# Patient Record
Sex: FEMALE | Race: OTHER | URBAN - METROPOLITAN AREA
[De-identification: names, ages, dates, MRNs, and addresses within clinical notes are randomized per-mention and may not be internally consistent; named-entity substitution may affect disease eponyms.]

---

## 2019-09-27 ENCOUNTER — EMERGENCY (EMERGENCY)
Facility: HOSPITAL | Age: 49
LOS: 1 days | Discharge: HOME | End: 2019-09-27
Admitting: EMERGENCY MEDICINE
Payer: COMMERCIAL

## 2019-09-27 VITALS
DIASTOLIC BLOOD PRESSURE: 90 MMHG | RESPIRATION RATE: 16 BRPM | OXYGEN SATURATION: 98 % | TEMPERATURE: 98 F | SYSTOLIC BLOOD PRESSURE: 136 MMHG | HEART RATE: 54 BPM
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
## 2019-09-27 DIAGNOSIS — S69.91XA UNSPECIFIED INJURY OF RIGHT WRIST, HAND AND FINGER(S), INITIAL ENCOUNTER: ICD-10-CM

## 2019-09-27 DIAGNOSIS — M79.672 PAIN IN LEFT FOOT: ICD-10-CM
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
## 2019-09-27 DIAGNOSIS — S92.355A NONDISPLACED FRACTURE OF FIFTH METATARSAL BONE, LEFT FOOT, INITIAL ENCOUNTER FOR CLOSED FRACTURE: ICD-10-CM

## 2019-09-27 PROCEDURE — 73130 X-RAY EXAM OF HAND: CPT

## 2019-09-27 PROCEDURE — 28470 CLTX METATARSAL FX WO MNP EA: CPT | Mod: LT

## 2019-09-27 PROCEDURE — 73610 X-RAY EXAM OF ANKLE: CPT | Mod: 26,LT

## 2019-09-27 PROCEDURE — 99284 EMERGENCY DEPT VISIT MOD MDM: CPT

## 2019-09-27 PROCEDURE — 99284 EMERGENCY DEPT VISIT MOD MDM: CPT | Mod: 25

## 2019-09-27 PROCEDURE — 73630 X-RAY EXAM OF FOOT: CPT

## 2019-09-27 PROCEDURE — 73130 X-RAY EXAM OF HAND: CPT | Mod: 26,RT

## 2019-09-27 PROCEDURE — 73630 X-RAY EXAM OF FOOT: CPT | Mod: 26,LT

## 2019-09-27 PROCEDURE — 73610 X-RAY EXAM OF ANKLE: CPT

## 2019-09-27 RX ORDER — IBUPROFEN 200 MG
600 TABLET ORAL ONCE
Refills: 0 | Status: COMPLETED | OUTPATIENT
Start: 2019-09-27 | End: 2019-09-27

## 2019-09-27 RX ADMIN — Medication 30 MILLILITER(S): at 10:56

## 2019-09-27 RX ADMIN — Medication 600 MILLIGRAM(S): at 10:56

## 2019-09-27 NOTE — ED ADULT TRIAGE NOTE - SPO2 (%)
Discussion/Summary  Faxed pt copy of sustaining meal plan with exchange list. Reviewed meal plan and exchange list over phone. Pt verbalized understanding. Pt verbalized frustrating with meal replacements. Pt did appreciate weekly support. Encouraged pt to continue to follow meal plan and get variety in diet.      Signatures   Electronically signed by : Melinda Correa RD; Apr 10 2017  2:03PM CST    
98

## 2019-09-27 NOTE — ED PROVIDER NOTE - PHYSICAL EXAMINATION
L ankle swelling and tenderness along 5th metatarsal; able to flex and extend. No obvious deformity or motor sensory deficit. Pulse palpable and intact. No pain at distal fibula. No tenderness to knee or calf.   R hand pain to 5th finger at PIP joint. Positive minimal swelling. Full ROM. No deformity, tenderness to MCP or wrist. Full ROM of elbow and shoulder. No spinal tenderness.

## 2019-09-27 NOTE — ED PROVIDER NOTE - OBJECTIVE STATEMENT
48 y/o F with no significant PMHX presents to ED with complaint of left foot pain and right hand pain s/p fall. Pt. stepped in pothole and consequently lost balance. Pt denies head injury, LOC, N/V, SOB, chest pain and abdominal pain. Pt denies experiencing generalized or localized numbness and tingling.

## 2019-09-27 NOTE — ED PROVIDER NOTE - CARE PROVIDER_API CALL
Radha Jason)  Orthopaedic Surgery  [address] [place] [address]  Phone: [phone]  Fax: [phone]  Follow Up Time:

## 2019-09-27 NOTE — ED ADULT NURSE NOTE - OBJECTIVE STATEMENT
50 y/o F a&ox4 BIBA c/o L foot pain. Pt state " I was crossing the street and my foot twisted in a pot hole." no LOC, no hitting head. 10/10 throbbing foot pain to lateral aspect of L ankle. + tenderness with palpation. slight swelling noted lateral aspect of L foot. no deformity noted.  denies numbness, tingling, radiation of pain. did not take medication for pain relief. no sig PMH. PSH of . no daily medication use.

## 2019-09-27 NOTE — ED PROVIDER NOTE - DIAGNOSTIC INTERPRETATION
Foot : left foot + nondisplaced fracture at 5th metatarsal, no dislocation, no avulsion fx.   hand :right hand 5th phalanx no ac fx, no dislocation or avulsion

## 2019-09-27 NOTE — ED PROVIDER NOTE - CLINICAL SUMMARY MEDICAL DECISION MAKING FREE TEXT BOX
Patient with left foot 5th MTP fx and right hand 5th phalanx injury no fx. Recommend ice therapy, nsaids and cam walker. Follow up with ortho.

## 2019-09-27 NOTE — ED PROVIDER NOTE - PATIENT PORTAL LINK FT
You can access the FollowMyHealth Patient Portal offered by [facility] by registering at the following website: [email or web address]. By joining [employer]’s FollowMyHealth portal, you will also be able to view your health information using other applications (apps) compatible with our system.

## 2020-08-22 NOTE — ED ADULT NURSE NOTE - CAS DISCH ACCOMP BY
connected to a pressurized flush line/Seldinger technique/all materials/supplies accounted for at end of procedure/location identified, draped/prepped, sterile technique used, needle inserted/introduced/positive blood return obtained via catheter/sutured in place positive blood return obtained via catheter/sutured in place/location identified, draped/prepped, sterile technique used, needle inserted/introduced/connected to a pressurized flush line/all materials/supplies accounted for at end of procedure/Seldinger technique sterile dressing applied/ultrasound guidance/lumen(s) aspirated and flushed/guidewire recovered/sterile technique, catheter placed Self

## 2024-08-02 ENCOUNTER — NON-APPOINTMENT (OUTPATIENT)
Age: 54
End: 2024-08-02

## 2024-08-07 PROBLEM — Z78.9 OTHER SPECIFIED HEALTH STATUS: Chronic | Status: ACTIVE | Noted: 2019-09-29

## 2024-08-09 PROBLEM — Z00.00 ENCOUNTER FOR PREVENTIVE HEALTH EXAMINATION: Status: ACTIVE | Noted: 2024-08-09

## 2024-08-12 ENCOUNTER — APPOINTMENT (OUTPATIENT)
Dept: ORTHOPEDIC SURGERY | Facility: CLINIC | Age: 54
End: 2024-08-12
Payer: COMMERCIAL

## 2024-08-12 VITALS — HEIGHT: 63 IN | WEIGHT: 210 LBS | BODY MASS INDEX: 37.21 KG/M2

## 2024-08-12 DIAGNOSIS — M76.892 OTHER SPECIFIED ENTHESOPATHIES OF LEFT LOWER LIMB, EXCLUDING FOOT: ICD-10-CM

## 2024-08-12 DIAGNOSIS — M76.02 GLUTEAL TENDINITIS, LEFT HIP: ICD-10-CM

## 2024-08-12 DIAGNOSIS — Z78.9 OTHER SPECIFIED HEALTH STATUS: ICD-10-CM

## 2024-08-12 PROCEDURE — 99204 OFFICE O/P NEW MOD 45 MIN: CPT

## 2024-08-12 RX ORDER — NAPROXEN 500 MG/1
500 TABLET ORAL
Qty: 20 | Refills: 0 | Status: ACTIVE | COMMUNITY
Start: 2024-08-12 | End: 1900-01-01

## 2024-08-12 NOTE — DISCUSSION/SUMMARY
[de-identified] : Discussed options, HEP Naproxen 500mg BID 3-4 days then prn Obtain MRI pelvis eval bony calcifications at ASIS and iliac crest f/u p MRI ----------------------------------------------------------------------------  Patient warned of specific risks of medication related to bleeding, GI issues, increase blood pressure, and cardiac risks in addition to additional risks.  Patient advised to discuss with PMD  if any presence of stated issues.     ----------------------------------------------------------------------------   All relevant imaging studies pertinent to today's visit, including x-rays, MRI's and/or other advanced imaging studies (CT/etc) were independently interpreted and reviewed with the patient as needed. Implications of the studies together with the patient's clinical picture were discussed to formulate a working diagnosis and management options were detailed.   The patient and/or guardian was advised of the diagnosis.  The natural history of the pathology was explained in full. All questions were answered.  The risks and benefits of conservative and interventional treatment alternatives were explained to the patient  The patient and/or guardian was advised if any advanced diagnostic/imaging study (MRI/CT/etc) is ordered to evaluate potential pathology in the affected area(s), they should follow up in the office to review the results of the study and determine further management that may be indicated.

## 2024-08-12 NOTE — HISTORY OF PRESENT ILLNESS
[Sudden] : sudden [8] : 8 [7] : 7 [Radiating] : radiating [Sharp] : sharp [Frequent] : frequent [de-identified] : This is MsRhea JUAN J SINGER  a 54 year old female who comes in today complaining of left hip pain for 2-3 weeks with no injury.  Anterolateral hip pain. Takes tylenol w no relief [] : no [FreeTextEntry7] : into groin [de-identified] : xray

## 2024-08-12 NOTE — IMAGING
[Left] : left hip with pelvis [All Views] : anteroposterior, lateral [Outside films reviewed] : Outside films reviewed [de-identified] :   ----------------------------------------------------------------------------   Left hip exam:   Inspection: no deformity, no swelling, no gross limb length discrepancy ROM:    Flexion: 100    ER: 50    IR: 20 Tenderness:+  Illiac crest , ASIS    (neg) Groin tenderness    (neg) Greater trochanteric tenderness    (neg) Buttock tenderness    (neg) IT Band tenderness    (neg) Anterior thigh tenderness    (neg)ASIS tenderness    (neg) Ischial tuberosity    (neg) Hamstring muscle tenderness Additional tests:    (neg) FADIR    (neg) MICHELLE    (neg) Resisted hip flexion pain    (neg) Apprehension (external rotation/extension)    (neg) Posterior pain with forced hip flexion and knee extension    (neg) Tight hamstrings    (neg) Log roll    (neg) Axial load Strength: 5/5 IP/Q/H/TA/GS/EHL Neuro: In tact to light touch throughout, DTR's wnl Vascularity: Extremity warm and well perfused Gait: normal.   [FreeTextEntry9] : calcifications at ASIS, small cystic change femoral head/neck junction

## 2024-08-26 ENCOUNTER — APPOINTMENT (OUTPATIENT)
Dept: MRI IMAGING | Facility: CLINIC | Age: 54
End: 2024-08-26

## 2024-09-04 ENCOUNTER — EMERGENCY (EMERGENCY)
Facility: HOSPITAL | Age: 54
LOS: 0 days | Discharge: HOME | End: 2024-09-04
Attending: STUDENT IN AN ORGANIZED HEALTH CARE EDUCATION/TRAINING PROGRAM
Payer: COMMERCIAL

## 2024-09-04 VITALS
DIASTOLIC BLOOD PRESSURE: 86 MMHG | SYSTOLIC BLOOD PRESSURE: 130 MMHG | RESPIRATION RATE: 18 BRPM | WEIGHT: 220.02 LBS | HEART RATE: 69 BPM | TEMPERATURE: 98 F | HEIGHT: 63 IN | OXYGEN SATURATION: 98 %

## 2024-09-04 DIAGNOSIS — G43.909 MIGRAINE, UNSPECIFIED, NOT INTRACTABLE, WITHOUT STATUS MIGRAINOSUS: ICD-10-CM

## 2024-09-04 DIAGNOSIS — Z86.69 PERSONAL HISTORY OF OTHER DISEASES OF THE NERVOUS SYSTEM AND SENSE ORGANS: ICD-10-CM

## 2024-09-04 DIAGNOSIS — R51.9 HEADACHE, UNSPECIFIED: ICD-10-CM

## 2024-09-04 LAB
ALBUMIN SERPL ELPH-MCNC: 3.1 G/DL — LOW (ref 3.3–5)
ALP SERPL-CCNC: 56 U/L — SIGNIFICANT CHANGE UP (ref 40–120)
ALT FLD-CCNC: 24 U/L — SIGNIFICANT CHANGE UP (ref 12–78)
ANION GAP SERPL CALC-SCNC: 7 MMOL/L — SIGNIFICANT CHANGE UP (ref 5–17)
AST SERPL-CCNC: 14 U/L — LOW (ref 15–37)
BASOPHILS # BLD AUTO: 0.02 K/UL — SIGNIFICANT CHANGE UP (ref 0–0.2)
BASOPHILS NFR BLD AUTO: 0.3 % — SIGNIFICANT CHANGE UP (ref 0–2)
BILIRUB SERPL-MCNC: 0.2 MG/DL — SIGNIFICANT CHANGE UP (ref 0.2–1.2)
BUN SERPL-MCNC: 12 MG/DL — SIGNIFICANT CHANGE UP (ref 7–23)
CALCIUM SERPL-MCNC: 9.7 MG/DL — SIGNIFICANT CHANGE UP (ref 8.5–10.1)
CHLORIDE SERPL-SCNC: 108 MMOL/L — SIGNIFICANT CHANGE UP (ref 96–108)
CO2 SERPL-SCNC: 24 MMOL/L — SIGNIFICANT CHANGE UP (ref 22–31)
CREAT SERPL-MCNC: 0.82 MG/DL — SIGNIFICANT CHANGE UP (ref 0.5–1.3)
EGFR: 85 ML/MIN/1.73M2 — SIGNIFICANT CHANGE UP
EOSINOPHIL # BLD AUTO: 0.16 K/UL — SIGNIFICANT CHANGE UP (ref 0–0.5)
EOSINOPHIL NFR BLD AUTO: 2.7 % — SIGNIFICANT CHANGE UP (ref 0–6)
GLUCOSE SERPL-MCNC: 116 MG/DL — HIGH (ref 70–99)
HCT VFR BLD CALC: 35.9 % — SIGNIFICANT CHANGE UP (ref 34.5–45)
HGB BLD-MCNC: 11.9 G/DL — SIGNIFICANT CHANGE UP (ref 11.5–15.5)
IMM GRANULOCYTES NFR BLD AUTO: 0.3 % — SIGNIFICANT CHANGE UP (ref 0–0.9)
LYMPHOCYTES # BLD AUTO: 2.68 K/UL — SIGNIFICANT CHANGE UP (ref 1–3.3)
LYMPHOCYTES # BLD AUTO: 45.1 % — HIGH (ref 13–44)
MCHC RBC-ENTMCNC: 33.1 G/DL — SIGNIFICANT CHANGE UP (ref 32–36)
MCHC RBC-ENTMCNC: 33.2 PG — SIGNIFICANT CHANGE UP (ref 27–34)
MCV RBC AUTO: 100.3 FL — HIGH (ref 80–100)
MONOCYTES # BLD AUTO: 0.54 K/UL — SIGNIFICANT CHANGE UP (ref 0–0.9)
MONOCYTES NFR BLD AUTO: 9.1 % — SIGNIFICANT CHANGE UP (ref 2–14)
NEUTROPHILS # BLD AUTO: 2.52 K/UL — SIGNIFICANT CHANGE UP (ref 1.8–7.4)
NEUTROPHILS NFR BLD AUTO: 42.5 % — LOW (ref 43–77)
NRBC # BLD: 0 /100 WBCS — SIGNIFICANT CHANGE UP (ref 0–0)
PLATELET # BLD AUTO: 225 K/UL — SIGNIFICANT CHANGE UP (ref 150–400)
POTASSIUM SERPL-MCNC: 4 MMOL/L — SIGNIFICANT CHANGE UP (ref 3.5–5.3)
POTASSIUM SERPL-SCNC: 4 MMOL/L — SIGNIFICANT CHANGE UP (ref 3.5–5.3)
PROT SERPL-MCNC: 7.7 GM/DL — SIGNIFICANT CHANGE UP (ref 6–8.3)
RBC # BLD: 3.58 M/UL — LOW (ref 3.8–5.2)
RBC # FLD: 12.6 % — SIGNIFICANT CHANGE UP (ref 10.3–14.5)
SODIUM SERPL-SCNC: 139 MMOL/L — SIGNIFICANT CHANGE UP (ref 135–145)
WBC # BLD: 5.94 K/UL — SIGNIFICANT CHANGE UP (ref 3.8–10.5)
WBC # FLD AUTO: 5.94 K/UL — SIGNIFICANT CHANGE UP (ref 3.8–10.5)

## 2024-09-04 PROCEDURE — 99284 EMERGENCY DEPT VISIT MOD MDM: CPT

## 2024-09-04 PROCEDURE — 70450 CT HEAD/BRAIN W/O DYE: CPT | Mod: 26,MC

## 2024-09-04 RX ORDER — METOCLOPRAMIDE HCL 5 MG
10 TABLET ORAL ONCE
Refills: 0 | Status: COMPLETED | OUTPATIENT
Start: 2024-09-04 | End: 2024-09-04

## 2024-09-04 RX ORDER — TRAMADOL HYDROCHLORIDE 200 MG/1
50 TABLET, EXTENDED RELEASE ORAL ONCE
Refills: 0 | Status: DISCONTINUED | OUTPATIENT
Start: 2024-09-04 | End: 2024-09-04

## 2024-09-04 RX ADMIN — Medication 10 MILLIGRAM(S): at 21:33

## 2024-09-04 RX ADMIN — TRAMADOL HYDROCHLORIDE 50 MILLIGRAM(S): 200 TABLET, EXTENDED RELEASE ORAL at 21:34

## 2024-09-04 NOTE — ED ADULT NURSE NOTE - OBJECTIVE STATEMENT
received er bed h22 c/o frontal headache with posterior neck pain intermittently x 2 weeks denies dizziness denies n/v or visual disturbance denies eye itching or d/c + photosensitivity hx migraines with similar symptoms no longer on rx migraine meds due to constipation using otc tylenol/ibuprofen prn without relief seen at urgent care 2 weeks ago for same c/o received im injection unsure of med without relief no focal neuro deficits moves all extremities with = strength and coordination denies fever/chills

## 2024-09-04 NOTE — ED PROVIDER NOTE - CLINICAL SUMMARY MEDICAL DECISION MAKING FREE TEXT BOX
54 year old female with h/o migraine headache presents today c/o headaches x 2 weeks, pt describes intermittent throbbing pain located to her forehead and posterior head, rated 9/10 at this time, pt has been using tylenol and motrin for pain without relief, pt does see a neurologist, was prescribed medication for her migraines but was very constipated, pt stopped the medication two weeks ago and now with pain, pt may start botox for future pain control, she otherwise denies flu like symptoms, fevers or chills, visual or speech changes, weakness, paresthesias. On exam pt is well appearing, nontoxic/non-lethargic appearing and in no distress appearing, presently related to pain or 9 out of 10.  Her exam is benign including neuro.  NIHSS is 0.  Differential diagnosis includes but not limited to exacerbation of migraine headache, viral illness, stress, dehydration.  Labs drawn, pain medication and CT. Will reassess and dispo    labs reviewed, ct negative for acute pathology

## 2024-09-04 NOTE — ED ADULT TRIAGE NOTE - CHIEF COMPLAINT QUOTE
Throbbing pains to frontal area of head and over eyes  for days, no associated symptoms, treated at urgent care , using otc medications without relief.

## 2024-09-04 NOTE — ED PROVIDER NOTE - OBJECTIVE STATEMENT
54 year old female with h/o migraine headache presents today c/o headaches x 2 weeks, pt describes intermittent throbbing pain located to her forehead and posterior head, rated 9/10 at this time, pt has been using tylenol and motrin for pain 54 year old female with h/o migraine headache presents today c/o headaches x 2 weeks, pt describes intermittent throbbing pain located to her forehead and posterior head, rated 9/10 at this time, pt has been using tylenol and motrin for pain with 54 year old female with h/o migraine headache presents today c/o headaches x 2 weeks, pt describes intermittent throbbing pain located to her forehead and posterior head, rated 9/10 at this time, pt has been using tylenol and motrin for pain without relief, pt does see a neurologist, was prescribed medication for her migraines but was very constipated, pt stopped the medication two weeks ago and now with pain, pt may start botox for future pain control, she otherwise denies flu like symptoms, fevers or chills, visual or speech changes, weakness, paresthesias. On exam pt is well appearing, nontoxic/non-lethargic appearing and in no distress appearing, presently related to pain or 9 out of 10.  Her exam is benign including neuro.  NIHSS is 0.  Differential diagnosis includes but not limited to exacerbation of migraine headache, viral illness, stress, dehydration.  Labs drawn, pain medication and CT. Will reassess and dispo see mdm

## 2024-09-04 NOTE — ED PROVIDER NOTE - PATIENT PORTAL LINK FT
You can access the FollowMyHealth Patient Portal offered by [facility] by registering at the following website: [email or web address]. By joining [employer]’s FollowMyHealth portal, you will also be able to view your health information using other applications (apps) compatible with our system.

## 2024-09-05 RX ORDER — BUTALBITAL, ACETAMINOPHEN AND CAFFEINE 50; 325; 40 MG/1; MG/1; MG/1
2 TABLET ORAL
Qty: 18 | Refills: 0
Start: 2024-09-05

## 2024-09-06 ENCOUNTER — APPOINTMENT (OUTPATIENT)
Dept: ORTHOPEDIC SURGERY | Facility: CLINIC | Age: 54
End: 2024-09-06

## 2024-09-07 ENCOUNTER — EMERGENCY (EMERGENCY)
Facility: HOSPITAL | Age: 54
LOS: 0 days | Discharge: HOME | End: 2024-09-07
Attending: STUDENT IN AN ORGANIZED HEALTH CARE EDUCATION/TRAINING PROGRAM
Payer: COMMERCIAL

## 2024-09-07 VITALS
RESPIRATION RATE: 20 BRPM | TEMPERATURE: 99 F | DIASTOLIC BLOOD PRESSURE: 99 MMHG | OXYGEN SATURATION: 98 % | WEIGHT: 220.02 LBS | SYSTOLIC BLOOD PRESSURE: 162 MMHG | HEIGHT: 63 IN | HEART RATE: 76 BPM

## 2024-09-07 VITALS
RESPIRATION RATE: 15 BRPM | OXYGEN SATURATION: 96 % | TEMPERATURE: 98 F | HEART RATE: 59 BPM | SYSTOLIC BLOOD PRESSURE: 116 MMHG | DIASTOLIC BLOOD PRESSURE: 78 MMHG

## 2024-09-07 DIAGNOSIS — G43.909 MIGRAINE, UNSPECIFIED, NOT INTRACTABLE, WITHOUT STATUS MIGRAINOSUS: ICD-10-CM

## 2024-09-07 DIAGNOSIS — R51.9 HEADACHE, UNSPECIFIED: ICD-10-CM

## 2024-09-07 LAB
ALBUMIN SERPL ELPH-MCNC: 3.2 G/DL — LOW (ref 3.3–5)
ALP SERPL-CCNC: 58 U/L — SIGNIFICANT CHANGE UP (ref 40–120)
ALT FLD-CCNC: 22 U/L — SIGNIFICANT CHANGE UP (ref 12–78)
ANION GAP SERPL CALC-SCNC: 7 MMOL/L — SIGNIFICANT CHANGE UP (ref 5–17)
AST SERPL-CCNC: 13 U/L — LOW (ref 15–37)
BASOPHILS # BLD AUTO: 0.01 K/UL — SIGNIFICANT CHANGE UP (ref 0–0.2)
BASOPHILS NFR BLD AUTO: 0.2 % — SIGNIFICANT CHANGE UP (ref 0–2)
BILIRUB SERPL-MCNC: 0.3 MG/DL — SIGNIFICANT CHANGE UP (ref 0.2–1.2)
BUN SERPL-MCNC: 13 MG/DL — SIGNIFICANT CHANGE UP (ref 7–23)
CALCIUM SERPL-MCNC: 10.1 MG/DL — SIGNIFICANT CHANGE UP (ref 8.5–10.1)
CHLORIDE SERPL-SCNC: 106 MMOL/L — SIGNIFICANT CHANGE UP (ref 96–108)
CO2 SERPL-SCNC: 26 MMOL/L — SIGNIFICANT CHANGE UP (ref 22–31)
CREAT SERPL-MCNC: 0.97 MG/DL — SIGNIFICANT CHANGE UP (ref 0.5–1.3)
EGFR: 70 ML/MIN/1.73M2 — SIGNIFICANT CHANGE UP
EOSINOPHIL # BLD AUTO: 0.11 K/UL — SIGNIFICANT CHANGE UP (ref 0–0.5)
EOSINOPHIL NFR BLD AUTO: 2.1 % — SIGNIFICANT CHANGE UP (ref 0–6)
GLUCOSE SERPL-MCNC: 103 MG/DL — HIGH (ref 70–99)
HCT VFR BLD CALC: 35.3 % — SIGNIFICANT CHANGE UP (ref 34.5–45)
HGB BLD-MCNC: 12 G/DL — SIGNIFICANT CHANGE UP (ref 11.5–15.5)
IMM GRANULOCYTES NFR BLD AUTO: 0.4 % — SIGNIFICANT CHANGE UP (ref 0–0.9)
LYMPHOCYTES # BLD AUTO: 2.13 K/UL — SIGNIFICANT CHANGE UP (ref 1–3.3)
LYMPHOCYTES # BLD AUTO: 40.7 % — SIGNIFICANT CHANGE UP (ref 13–44)
MCHC RBC-ENTMCNC: 33.1 PG — SIGNIFICANT CHANGE UP (ref 27–34)
MCHC RBC-ENTMCNC: 34 G/DL — SIGNIFICANT CHANGE UP (ref 32–36)
MCV RBC AUTO: 97.2 FL — SIGNIFICANT CHANGE UP (ref 80–100)
MONOCYTES # BLD AUTO: 0.61 K/UL — SIGNIFICANT CHANGE UP (ref 0–0.9)
MONOCYTES NFR BLD AUTO: 11.7 % — SIGNIFICANT CHANGE UP (ref 2–14)
NEUTROPHILS # BLD AUTO: 2.35 K/UL — SIGNIFICANT CHANGE UP (ref 1.8–7.4)
NEUTROPHILS NFR BLD AUTO: 44.9 % — SIGNIFICANT CHANGE UP (ref 43–77)
NRBC # BLD: 0 /100 WBCS — SIGNIFICANT CHANGE UP (ref 0–0)
PLATELET # BLD AUTO: 246 K/UL — SIGNIFICANT CHANGE UP (ref 150–400)
POTASSIUM SERPL-MCNC: 3.9 MMOL/L — SIGNIFICANT CHANGE UP (ref 3.5–5.3)
POTASSIUM SERPL-SCNC: 3.9 MMOL/L — SIGNIFICANT CHANGE UP (ref 3.5–5.3)
PROT SERPL-MCNC: 8 GM/DL — SIGNIFICANT CHANGE UP (ref 6–8.3)
RBC # BLD: 3.63 M/UL — LOW (ref 3.8–5.2)
RBC # FLD: 12.6 % — SIGNIFICANT CHANGE UP (ref 10.3–14.5)
SODIUM SERPL-SCNC: 139 MMOL/L — SIGNIFICANT CHANGE UP (ref 135–145)
WBC # BLD: 5.23 K/UL — SIGNIFICANT CHANGE UP (ref 3.8–10.5)
WBC # FLD AUTO: 5.23 K/UL — SIGNIFICANT CHANGE UP (ref 3.8–10.5)

## 2024-09-07 PROCEDURE — 99284 EMERGENCY DEPT VISIT MOD MDM: CPT

## 2024-09-07 RX ORDER — KETOROLAC TROMETHAMINE 30 MG/ML
30 INJECTION, SOLUTION INTRAMUSCULAR ONCE
Refills: 0 | Status: DISCONTINUED | OUTPATIENT
Start: 2024-09-07 | End: 2024-09-07

## 2024-09-07 RX ORDER — METOCLOPRAMIDE HCL 5 MG
10 TABLET ORAL ONCE
Refills: 0 | Status: COMPLETED | OUTPATIENT
Start: 2024-09-07 | End: 2024-09-07

## 2024-09-07 RX ORDER — DIPHENHYDRAMINE HCL 50 MG
50 CAPSULE ORAL ONCE
Refills: 0 | Status: COMPLETED | OUTPATIENT
Start: 2024-09-07 | End: 2024-09-07

## 2024-09-07 RX ORDER — DEXAMETHASONE 0.75 MG
10 TABLET ORAL ONCE
Refills: 0 | Status: COMPLETED | OUTPATIENT
Start: 2024-09-07 | End: 2024-09-07

## 2024-09-07 RX ORDER — SODIUM CHLORIDE 9 MG/ML
1000 INJECTION INTRAMUSCULAR; INTRAVENOUS; SUBCUTANEOUS ONCE
Refills: 0 | Status: COMPLETED | OUTPATIENT
Start: 2024-09-07 | End: 2024-09-07

## 2024-09-07 RX ADMIN — SODIUM CHLORIDE 1000 MILLILITER(S): 9 INJECTION INTRAMUSCULAR; INTRAVENOUS; SUBCUTANEOUS at 04:37

## 2024-09-07 RX ADMIN — Medication 10 MILLIGRAM(S): at 04:38

## 2024-09-07 RX ADMIN — Medication 50 MILLIGRAM(S): at 04:38

## 2024-09-07 RX ADMIN — Medication 102 MILLIGRAM(S): at 05:06

## 2024-09-07 RX ADMIN — KETOROLAC TROMETHAMINE 30 MILLIGRAM(S): 30 INJECTION, SOLUTION INTRAMUSCULAR at 04:37

## 2024-09-07 NOTE — ED PROVIDER NOTE - CARE PROVIDER_API CALL
Mian Reyes)  Neurology  [address], [address]  [place] [address]  Phone: [phone]  Fax: [phone]  Follow Up Time:     Jessi Godfrey  Neurology  [address] [place] [address]  Phone: [phone]  Fax: [phone]  Follow Up Time:

## 2024-09-07 NOTE — ED ADULT TRIAGE NOTE - CHIEF COMPLAINT QUOTE
hx of migraines here 2 days ago for same pt reports meds given not effective. denies N/V, dizziness.

## 2024-09-07 NOTE — ED PROVIDER NOTE - PATIENT PORTAL LINK FT
You can access the FollowMyHealth Patient Portal offered by [facility] by registering at the following website: [email or web address]. By joining [employer]’s FollowMyHealth portal, you will also be able to view your health information using other applications (apps) compatible with our system.

## 2024-09-07 NOTE — ED PROVIDER NOTE - CLINICAL SUMMARY MEDICAL DECISION MAKING FREE TEXT BOX
53yo female with pmh migraines presenting with headache.  Was seen here 2 days ago for the same and initially improved after meds but came back yesterday and still hurting.  Denies any new symptoms.  Pain is the same as that visit, R side forehead radiating posteriorly.  No fever, sudden onset, numbness, weakness, visual changes, difficulty ambulating.  CT previous visit negative for acute pathology.  Neuro intact here.  Seems c/w migraine, patient states similar to prior.  Will medicate and reassess.

## 2024-09-07 NOTE — ED PROVIDER NOTE - NSFOLLOWUPINSTRUCTIONS_ED_ALL_ED_FT
Rest, drink plenty of fluids  Advance activity as tolerated  Continue all previously prescribed medications as directed  Follow up with your PMD - bring copies of your results  Return to the ER for severe pain, fevers, worsening symptoms, or other new or concerning symptoms

## 2024-09-07 NOTE — ED ADULT TRIAGE NOTE - CODE STROKE ACTIVE YN
Patient is requesting medication refill to [facility] [place]. Please approve or deny this request.    Rx requested:  Requested Prescriptions     Pending Prescriptions Disp Refills    levothyroxine (SYNTHROID) 50 MCG tablet 30 tablet      Sig: take 1 tablet by mouth once daily       Last Office Visit:   12/18/2017  Upcoming appt on 10/8/2018    Next Visit Date:  Future Appointments  Date Time Provider Ariadna Bright   10/8/2018 10:00 AM MD Girish Patino Marylu 89 No

## 2024-09-07 NOTE — ED ADULT NURSE NOTE - OBJECTIVE STATEMENT
pt is AOx3, ambulatory, pt presents to the ED tonight with complaints of migraine since Wednesday. pt states she came into the ED on Wednesday with complaints of migraines. pt states she had a full workup in the ED and the CT/blood work was negative. pt returned do to no relief after being sent home with medication. pt states the pain is getting progressively worse. pt denies any dizziness/lightheadedness/N/V/D at this time. pt rates her pain a 10/10, pt denies taking pain medication PTA in the ED tonight. pt denies any PMH

## 2024-09-07 NOTE — ED ADULT NURSE NOTE - NSFALLUNIVINTERV_ED_ALL_ED
Bed/Stretcher in lowest position, wheels locked, appropriate side rails in place/Call bell, personal items and telephone in reach/Instruct patient to call for assistance before getting out of bed/chair/stretcher/Non-slip footwear applied when patient is off stretcher/[place] to call system/Physically safe environment - no spills, clutter or unnecessary equipment/Purposeful proactive rounding/Room/bathroom lighting operational, light cord in reach

## 2024-09-07 NOTE — ED PROVIDER NOTE - PHYSICAL EXAMINATION
General appearance: Nontoxic appearing, conversant, afebrile    Eyes: anicteric sclerae, BROOKE, EOMI   HENT: Atraumatic; oropharynx clear, MMM and no ulcerations, no pharyngeal erythema or exudate   Neck: Trachea midline; Full range of motion, supple   Pulm: normal respiratory effort and no intercostal retractions, normal work of breathing   Extremities: No peripheral edema, no gross deformities, FROM x4   Skin: Dry, normal temperature, turgor and texture; no rash   Psych: Appropriate affect, cooperative    Neuro: CN2-12 grossly intact, speech coherent, MS +5/5 in UE and LE BL, finger to nose smooth and rapid, gross sensation intact in UE and LE BL

## 2024-10-04 ENCOUNTER — NON-APPOINTMENT (OUTPATIENT)
Age: 54
End: 2024-10-04

## 2024-10-28 ENCOUNTER — INPATIENT (INPATIENT)
Facility: HOSPITAL | Age: 54
LOS: 0 days | Discharge: HOME | End: 2024-10-29
Attending: INTERNAL MEDICINE | Admitting: INTERNAL MEDICINE
Payer: COMMERCIAL

## 2024-10-28 VITALS
TEMPERATURE: 98 F | WEIGHT: 199.96 LBS | RESPIRATION RATE: 18 BRPM | SYSTOLIC BLOOD PRESSURE: 128 MMHG | OXYGEN SATURATION: 96 % | HEIGHT: 63 IN | DIASTOLIC BLOOD PRESSURE: 91 MMHG | HEART RATE: 69 BPM

## 2024-10-28 LAB
ALBUMIN SERPL ELPH-MCNC: 3.3 G/DL — SIGNIFICANT CHANGE UP (ref 3.3–5)
ALP SERPL-CCNC: 55 U/L — SIGNIFICANT CHANGE UP (ref 40–120)
ALT FLD-CCNC: 25 U/L — SIGNIFICANT CHANGE UP (ref 12–78)
ANION GAP SERPL CALC-SCNC: 5 MMOL/L — SIGNIFICANT CHANGE UP (ref 5–17)
APTT BLD: 27.3 SEC — SIGNIFICANT CHANGE UP (ref 24.5–35.6)
AST SERPL-CCNC: 22 U/L — SIGNIFICANT CHANGE UP (ref 15–37)
BASOPHILS # BLD AUTO: 0.01 K/UL — SIGNIFICANT CHANGE UP (ref 0–0.2)
BASOPHILS NFR BLD AUTO: 0.2 % — SIGNIFICANT CHANGE UP (ref 0–2)
BILIRUB SERPL-MCNC: 0.3 MG/DL — SIGNIFICANT CHANGE UP (ref 0.2–1.2)
BUN SERPL-MCNC: 12 MG/DL — SIGNIFICANT CHANGE UP (ref 7–23)
CALCIUM SERPL-MCNC: 9.3 MG/DL — SIGNIFICANT CHANGE UP (ref 8.5–10.1)
CHLORIDE SERPL-SCNC: 108 MMOL/L — SIGNIFICANT CHANGE UP (ref 96–108)
CO2 SERPL-SCNC: 26 MMOL/L — SIGNIFICANT CHANGE UP (ref 22–31)
CREAT SERPL-MCNC: 0.9 MG/DL — SIGNIFICANT CHANGE UP (ref 0.5–1.3)
EGFR: 76 ML/MIN/1.73M2 — SIGNIFICANT CHANGE UP
EOSINOPHIL # BLD AUTO: 0.09 K/UL — SIGNIFICANT CHANGE UP (ref 0–0.5)
EOSINOPHIL NFR BLD AUTO: 2 % — SIGNIFICANT CHANGE UP (ref 0–6)
GLUCOSE SERPL-MCNC: 77 MG/DL — SIGNIFICANT CHANGE UP (ref 70–99)
HCG SERPL-ACNC: <1 MIU/ML — SIGNIFICANT CHANGE UP
HCT VFR BLD CALC: 36.2 % — SIGNIFICANT CHANGE UP (ref 34.5–45)
HGB BLD-MCNC: 11.8 G/DL — SIGNIFICANT CHANGE UP (ref 11.5–15.5)
IMM GRANULOCYTES NFR BLD AUTO: 0.2 % — SIGNIFICANT CHANGE UP (ref 0–0.9)
INR BLD: 1.31 RATIO — HIGH (ref 0.85–1.16)
LYMPHOCYTES # BLD AUTO: 2.25 K/UL — SIGNIFICANT CHANGE UP (ref 1–3.3)
LYMPHOCYTES # BLD AUTO: 49.6 % — HIGH (ref 13–44)
MCHC RBC-ENTMCNC: 32.2 PG — SIGNIFICANT CHANGE UP (ref 27–34)
MCHC RBC-ENTMCNC: 32.6 G/DL — SIGNIFICANT CHANGE UP (ref 32–36)
MCV RBC AUTO: 98.9 FL — SIGNIFICANT CHANGE UP (ref 80–100)
MONOCYTES # BLD AUTO: 0.39 K/UL — SIGNIFICANT CHANGE UP (ref 0–0.9)
MONOCYTES NFR BLD AUTO: 8.6 % — SIGNIFICANT CHANGE UP (ref 2–14)
NEUTROPHILS # BLD AUTO: 1.79 K/UL — LOW (ref 1.8–7.4)
NEUTROPHILS NFR BLD AUTO: 39.4 % — LOW (ref 43–77)
NRBC # BLD: 0 /100 WBCS — SIGNIFICANT CHANGE UP (ref 0–0)
PLATELET # BLD AUTO: 296 K/UL — SIGNIFICANT CHANGE UP (ref 150–400)
POTASSIUM SERPL-MCNC: 4.2 MMOL/L — SIGNIFICANT CHANGE UP (ref 3.5–5.3)
POTASSIUM SERPL-SCNC: 4.2 MMOL/L — SIGNIFICANT CHANGE UP (ref 3.5–5.3)
PROT SERPL-MCNC: 7.9 GM/DL — SIGNIFICANT CHANGE UP (ref 6–8.3)
PROTHROM AB SERPL-ACNC: 14.8 SEC — HIGH (ref 9.9–13.4)
RBC # BLD: 3.66 M/UL — LOW (ref 3.8–5.2)
RBC # FLD: 12.8 % — SIGNIFICANT CHANGE UP (ref 10.3–14.5)
SODIUM SERPL-SCNC: 139 MMOL/L — SIGNIFICANT CHANGE UP (ref 135–145)
WBC # BLD: 4.54 K/UL — SIGNIFICANT CHANGE UP (ref 3.8–10.5)
WBC # FLD AUTO: 4.54 K/UL — SIGNIFICANT CHANGE UP (ref 3.8–10.5)

## 2024-10-28 PROCEDURE — 70498 CT ANGIOGRAPHY NECK: CPT | Mod: 26,MC

## 2024-10-28 PROCEDURE — 99222 1ST HOSP IP/OBS MODERATE 55: CPT

## 2024-10-28 PROCEDURE — 99285 EMERGENCY DEPT VISIT HI MDM: CPT

## 2024-10-28 PROCEDURE — 70496 CT ANGIOGRAPHY HEAD: CPT | Mod: 26,MC

## 2024-10-28 RX ORDER — SUMATRIPTAN SUCCINATE 6 MG/.5ML
6 INJECTION, SOLUTION SUBCUTANEOUS ONCE
Refills: 0 | Status: COMPLETED | OUTPATIENT
Start: 2024-10-28 | End: 2024-10-28

## 2024-10-28 RX ORDER — ACETAMINOPHEN 500 MG
1000 TABLET ORAL ONCE
Refills: 0 | Status: COMPLETED | OUTPATIENT
Start: 2024-10-28 | End: 2024-10-28

## 2024-10-28 RX ORDER — METOCLOPRAMIDE HCL 10 MG
10 TABLET ORAL ONCE
Refills: 0 | Status: COMPLETED | OUTPATIENT
Start: 2024-10-28 | End: 2024-10-28

## 2024-10-28 RX ORDER — DIPHENHYDRAMINE HCL 12.5MG/5ML
25 ELIXIR ORAL ONCE
Refills: 0 | Status: COMPLETED | OUTPATIENT
Start: 2024-10-28 | End: 2024-10-28

## 2024-10-28 RX ADMIN — Medication 400 MILLIGRAM(S): at 19:26

## 2024-10-28 RX ADMIN — Medication 25 MILLIGRAM(S): at 19:25

## 2024-10-28 RX ADMIN — SUMATRIPTAN SUCCINATE 6 MILLIGRAM(S): 6 INJECTION, SOLUTION SUBCUTANEOUS at 19:27

## 2024-10-28 RX ADMIN — Medication 10 MILLIGRAM(S): at 19:21

## 2024-10-28 NOTE — H&P ADULT - PROBLEM SELECTOR PLAN 1
- P/w blurred vision for 3 days. 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 Questionable narrowing proximal left posterior cerebral artery. 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 Anterior circulation is widely patent and unremarkable.  - CTA = The neck vasculature is widely patent. No significant stenosis, dissection, or occlusion.  - Start aspirin, statin for now  - Admit to telemetry  - Neuro check q4hrs  - F/u A1c, Lipid panel  - F/u MR brain noncont + MRV brain w/wo cont  - Neuro Dr. Owens consulted = Admit for MR Brain non Cont + MRV brain w/wo cont - P/w blurred vision for 3 days. 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 Questionable narrowing proximal left posterior cerebral artery. 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 Anterior circulation is widely patent and unremarkable.  - CTA = The neck vasculature is widely patent. No significant stenosis, dissection, or occlusion.  - Start aspirin, statin for now  - Admit to telemetry  - Neuro check q4hrs  - F/u A1c, Lipid panel  - F/u TTE with bubble  - F/u MR brain noncont + MRV brain w/wo cont  - Neuro Dr. Owens consulted = Admit for MR Brain non Cont + MRV brain w/wo cont - P/w blurred vision for 3 days. 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 Questionable narrowing proximal left posterior cerebral artery. 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 Anterior circulation is widely patent and unremarkable.  - CTA = The neck vasculature is widely patent. No significant stenosis, dissection, or occlusion.  - Start aspirin, statin for now  - Admit to telemetry  - Neuro check q4hrs  - F/u A1c, Lipid panel  - F/u TTE with bubble  - F/u OT eval  - F/u MR brain noncont + MRV brain w/wo cont  - Neuro Dr. Owens consulted = Admit for MR Brain non Cont + MRV brain w/wo cont

## 2024-10-28 NOTE — ED PROVIDER NOTE - CLINICAL SUMMARY MEDICAL DECISION MAKING FREE TEXT BOX
Patient with left eye visual field towards nasal aspect deficiency/diplopia noted.  Otherwise case was discussed with Dr. Owens recommended short-term stay for MRI MRV as needed patient states symptomatic..  Despite headache improvement patient continues to have left eye diplopia will admit for further evaluation with imaging and neurology consult.

## 2024-10-28 NOTE — ED ADULT NURSE NOTE - NSFALLUNIVINTERV_ED_ALL_ED
Bed/Stretcher in lowest position, wheels locked, appropriate side rails in place/Call bell, personal items and telephone in reach/Instruct patient to call for assistance before getting out of bed/chair/stretcher/Non-slip footwear applied when patient is off stretcher/[place] to call system/Physically safe environment - no spills, clutter or unnecessary equipment/Purposeful proactive rounding/Room/bathroom lighting operational, light cord in reach

## 2024-10-28 NOTE — ED PROVIDER NOTE - EYES, MLM
Clear bilaterally, pupils equal, round and reactive to light. Left eye with visual field on medial aspect noted diplopia upper quadrant, other fields normal vision.

## 2024-10-28 NOTE — H&P ADULT - NSHPADDITIONALINFOADULT_GEN_ALL_CORE
Direct patient care (interview and examination of patient), discussion with other providers, support staff and/or patient's family members, explaining in detail the diagnosis and plan to the patient; review of medical records, ordering diagnostic tests, personally reviewing radiologic and diagnostic tests mentioned above, analyzing results, and documentation.

## 2024-10-28 NOTE — H&P ADULT - HISTORY OF PRESENT ILLNESS
Patient is a 54F, with PMHx of Migraines, who comes in with 3 days of blurry vision.   When she covers one eye at a time, the vision is clear, but when she tries to see with both, vision is blurry. Also, when she    She has 2 month history of migraine, and the blurry vision does not come with the migraine episodes.    She says Sumatriptan helps with her migraine pain, but the effect is temporary. Patient is a 54F, with PMHx of Migraines, who comes in with 3 days of blurry vision. When she covers one eye at a time, the vision is clear, but when she tries to see with both, vision is blurry. Also, when she turns her head to the left, her left eye gets more blurry. She has 2 month history of migraine, and the blurry vision does not come with the migraine episodes. She says Sumatriptan helps with her migraine pain, but the effect is temporary. Patient denies headache, fever, chills, nausea, vomit, chest pain, shortness of breath, cough, lightheadedness, abdominal pain, diarrhea, constipation, dark/bloody stool, dysuria, hematuria, loss of strength, or loss of sensation. Patient is a 54F, with PMHx of Migraines and Glaucoma, who comes in with 3 days of blurry vision. When she covers one eye at a time, the vision is clear, but when she tries to see with both, vision is blurry. Also, when she turns her head to the left, her left eye gets more blurry. She has 2 month history of migraine, and the blurry vision does not come with the migraine episodes. She says Sumatriptan helps with her migraine pain, but the effect is temporary. She does have glaucoma and she already had her vision issue evaluted at ophthalmologist. Patient denies headache, fever, chills, nausea, vomit, chest pain, shortness of breath, cough, lightheadedness, abdominal pain, diarrhea, constipation, dark/bloody stool, dysuria, hematuria, loss of strength, or loss of sensation.

## 2024-10-28 NOTE — H&P ADULT - ASSESSMENT
Patient is a 54F, with PMHx of Migraines and Glaucoma, who comes in with 3 days of blurry vision. Admitted to r/o CVA.

## 2024-10-28 NOTE — ED PROVIDER NOTE - OBJECTIVE STATEMENT
54-year-old female with history of migraines otherwise presenting to ER due to persistent headache with symptoms of diplopia/blurred vision for the past few weeks.  Patient states that she had seen a ophthalmologist with a checkup that seemed okay except for glaucoma.  Patient states the left eye is blurry versus the right more so.  No focal weakness or numbness otherwise.

## 2024-10-28 NOTE — ED ADULT NURSE NOTE - ED STAT RN HANDOFF DETAILS
Report given to NATY yeh on ed hold . Rn made aware of pt pmh and hpi, lab and imaging results, IV access, medications given, and POC. VSS, nad noted. Pt A&Ox4. ambulatory.

## 2024-10-28 NOTE — H&P ADULT - NSHPPHYSICALEXAM_GEN_ALL_CORE
GENERAL: NAD  HEAD: Atraumatic, Normocephalic  EYES: EOMI. PERRLA. Conjunctiva and sclera clear  NECK: Supple  CHEST/LUNG: Clear to auscultation bilaterally; No wheeze, rhonchi, rales  HEART: Regular rate and rhythm; No murmurs  ABDOMEN: Soft, Nontender, Nondistended; Bowel sounds present  EXTREMITIES: No edema  NEURO: AAOx3  SKIN: No rashes or lesions

## 2024-10-28 NOTE — ED ADULT TRIAGE NOTE - CHIEF COMPLAINT QUOTE
Pt presents to the ED c/o migraine x 2 months  and blurry vision (L eye> R eye) since Thursday. pt states saw ophthalmologist on Friday and  states told eyes were "good"   hx: glaucoma. Last known well 10/24/24

## 2024-10-28 NOTE — ED ADULT NURSE NOTE - OBJECTIVE STATEMENT
Pt presents to the ED c/o migraine x 2 months  and blurry vision (L eye> R eye) since Thursday. pt states saw ophthalmologist on Friday and was informed that her eye exam was unremarkable.   hx: glaucoma. Last known well 10/24/24

## 2024-10-29 ENCOUNTER — TRANSCRIPTION ENCOUNTER (OUTPATIENT)
Age: 54
End: 2024-10-29

## 2024-10-29 VITALS
SYSTOLIC BLOOD PRESSURE: 106 MMHG | OXYGEN SATURATION: 95 % | WEIGHT: 221.79 LBS | RESPIRATION RATE: 18 BRPM | HEART RATE: 60 BPM | DIASTOLIC BLOOD PRESSURE: 77 MMHG

## 2024-10-29 DIAGNOSIS — H40.9 UNSPECIFIED GLAUCOMA: ICD-10-CM

## 2024-10-29 DIAGNOSIS — H53.8 OTHER VISUAL DISTURBANCES: ICD-10-CM

## 2024-10-29 DIAGNOSIS — Z29.9 ENCOUNTER FOR PROPHYLACTIC MEASURES, UNSPECIFIED: ICD-10-CM

## 2024-10-29 DIAGNOSIS — G43.909 MIGRAINE, UNSPECIFIED, NOT INTRACTABLE, WITHOUT STATUS MIGRAINOSUS: ICD-10-CM

## 2024-10-29 LAB
A1C WITH ESTIMATED AVERAGE GLUCOSE RESULT: 5.7 % — HIGH (ref 4–5.6)
ALBUMIN SERPL ELPH-MCNC: 3.2 G/DL — LOW (ref 3.3–5)
ALP SERPL-CCNC: 54 U/L — SIGNIFICANT CHANGE UP (ref 40–120)
ALT FLD-CCNC: 24 U/L — SIGNIFICANT CHANGE UP (ref 12–78)
ANION GAP SERPL CALC-SCNC: 3 MMOL/L — LOW (ref 5–17)
AST SERPL-CCNC: 16 U/L — SIGNIFICANT CHANGE UP (ref 15–37)
BILIRUB SERPL-MCNC: 0.3 MG/DL — SIGNIFICANT CHANGE UP (ref 0.2–1.2)
BUN SERPL-MCNC: 11 MG/DL — SIGNIFICANT CHANGE UP (ref 7–23)
CALCIUM SERPL-MCNC: 9.4 MG/DL — SIGNIFICANT CHANGE UP (ref 8.5–10.1)
CHLORIDE SERPL-SCNC: 110 MMOL/L — HIGH (ref 96–108)
CHOLEST SERPL-MCNC: 155 MG/DL — SIGNIFICANT CHANGE UP
CO2 SERPL-SCNC: 24 MMOL/L — SIGNIFICANT CHANGE UP (ref 22–31)
CREAT SERPL-MCNC: 0.92 MG/DL — SIGNIFICANT CHANGE UP (ref 0.5–1.3)
EGFR: 74 ML/MIN/1.73M2 — SIGNIFICANT CHANGE UP
ESTIMATED AVERAGE GLUCOSE: 117 MG/DL — HIGH (ref 68–114)
GLUCOSE BLDC GLUCOMTR-MCNC: 104 MG/DL — HIGH (ref 70–99)
GLUCOSE SERPL-MCNC: 89 MG/DL — SIGNIFICANT CHANGE UP (ref 70–99)
HCT VFR BLD CALC: 36.5 % — SIGNIFICANT CHANGE UP (ref 34.5–45)
HDLC SERPL-MCNC: 36 MG/DL — LOW
HGB BLD-MCNC: 11.8 G/DL — SIGNIFICANT CHANGE UP (ref 11.5–15.5)
LIPID PNL WITH DIRECT LDL SERPL: 109 MG/DL — HIGH
MCHC RBC-ENTMCNC: 32 PG — SIGNIFICANT CHANGE UP (ref 27–34)
MCHC RBC-ENTMCNC: 32.3 G/DL — SIGNIFICANT CHANGE UP (ref 32–36)
MCV RBC AUTO: 98.9 FL — SIGNIFICANT CHANGE UP (ref 80–100)
NON HDL CHOLESTEROL: 119 MG/DL — SIGNIFICANT CHANGE UP
NRBC # BLD: 0 /100 WBCS — SIGNIFICANT CHANGE UP (ref 0–0)
PLATELET # BLD AUTO: 297 K/UL — SIGNIFICANT CHANGE UP (ref 150–400)
POTASSIUM SERPL-MCNC: 4.1 MMOL/L — SIGNIFICANT CHANGE UP (ref 3.5–5.3)
POTASSIUM SERPL-SCNC: 4.1 MMOL/L — SIGNIFICANT CHANGE UP (ref 3.5–5.3)
PROT SERPL-MCNC: 7.7 GM/DL — SIGNIFICANT CHANGE UP (ref 6–8.3)
RBC # BLD: 3.69 M/UL — LOW (ref 3.8–5.2)
RBC # FLD: 13.1 % — SIGNIFICANT CHANGE UP (ref 10.3–14.5)
SODIUM SERPL-SCNC: 137 MMOL/L — SIGNIFICANT CHANGE UP (ref 135–145)
TRIGL SERPL-MCNC: 49 MG/DL — SIGNIFICANT CHANGE UP
WBC # BLD: 3.99 K/UL — SIGNIFICANT CHANGE UP (ref 3.8–10.5)
WBC # FLD AUTO: 3.99 K/UL — SIGNIFICANT CHANGE UP (ref 3.8–10.5)

## 2024-10-29 PROCEDURE — 99239 HOSP IP/OBS DSCHRG MGMT >30: CPT

## 2024-10-29 PROCEDURE — 70544 MR ANGIOGRAPHY HEAD W/O DYE: CPT | Mod: 26,59

## 2024-10-29 PROCEDURE — 70551 MRI BRAIN STEM W/O DYE: CPT | Mod: 26

## 2024-10-29 RX ORDER — METOCLOPRAMIDE HCL 10 MG
10 TABLET ORAL ONCE
Refills: 0 | Status: COMPLETED | OUTPATIENT
Start: 2024-10-29 | End: 2024-10-29

## 2024-10-29 RX ORDER — TIMOLOL MALEATE 0.5 %
0 DROPS OPHTHALMIC (EYE)
Qty: 0 | Refills: 4 | DISCHARGE

## 2024-10-29 RX ORDER — INFLUENZ VIR VAC TV P-SURF2003 15MCG/.5ML
0.5 SYRINGE (ML) INTRAMUSCULAR ONCE
Refills: 0 | Status: DISCONTINUED | OUTPATIENT
Start: 2024-10-29 | End: 2024-10-29

## 2024-10-29 RX ORDER — MELATONIN 5 MG
3 TABLET ORAL AT BEDTIME
Refills: 0 | Status: DISCONTINUED | OUTPATIENT
Start: 2024-10-29 | End: 2024-10-29

## 2024-10-29 RX ORDER — TIMOLOL MALEATE 0.5 %
1 DROPS OPHTHALMIC (EYE) DAILY
Refills: 0 | Status: DISCONTINUED | OUTPATIENT
Start: 2024-10-29 | End: 2024-10-29

## 2024-10-29 RX ORDER — LATANOPROST 0.005 %
1 DROPS OPHTHALMIC (EYE) AT BEDTIME
Refills: 0 | Status: DISCONTINUED | OUTPATIENT
Start: 2024-10-29 | End: 2024-10-29

## 2024-10-29 RX ORDER — ACETAMINOPHEN 500 MG
650 TABLET ORAL EVERY 6 HOURS
Refills: 0 | Status: DISCONTINUED | OUTPATIENT
Start: 2024-10-29 | End: 2024-10-29

## 2024-10-29 RX ORDER — ASPIRIN/MAG CARB/ALUMINUM AMIN 325 MG
81 TABLET ORAL DAILY
Refills: 0 | Status: DISCONTINUED | OUTPATIENT
Start: 2024-10-29 | End: 2024-10-29

## 2024-10-29 RX ORDER — LATANOPROST 0.005 %
0 DROPS OPHTHALMIC (EYE)
Qty: 0 | Refills: 0 | DISCHARGE

## 2024-10-29 RX ORDER — DIPHENHYDRAMINE HCL 12.5MG/5ML
25 ELIXIR ORAL ONCE
Refills: 0 | Status: COMPLETED | OUTPATIENT
Start: 2024-10-29 | End: 2024-10-29

## 2024-10-29 RX ORDER — SUMATRIPTAN SUCCINATE 6 MG/.5ML
50 INJECTION, SOLUTION SUBCUTANEOUS EVERY 12 HOURS
Refills: 0 | Status: DISCONTINUED | OUTPATIENT
Start: 2024-10-29 | End: 2024-10-29

## 2024-10-29 RX ORDER — ONDANSETRON HYDROCHLORIDE 2 MG/ML
4 INJECTION, SOLUTION INTRAMUSCULAR; INTRAVENOUS EVERY 8 HOURS
Refills: 0 | Status: DISCONTINUED | OUTPATIENT
Start: 2024-10-29 | End: 2024-10-29

## 2024-10-29 RX ORDER — MAGNESIUM, ALUMINUM HYDROXIDE 200-200 MG
30 TABLET,CHEWABLE ORAL EVERY 4 HOURS
Refills: 0 | Status: DISCONTINUED | OUTPATIENT
Start: 2024-10-29 | End: 2024-10-29

## 2024-10-29 RX ORDER — SUMATRIPTAN SUCCINATE 6 MG/.5ML
0 INJECTION, SOLUTION SUBCUTANEOUS
Qty: 0 | Refills: 0 | DISCHARGE

## 2024-10-29 RX ADMIN — Medication 1 DROP(S): at 11:44

## 2024-10-29 RX ADMIN — Medication 650 MILLIGRAM(S): at 11:00

## 2024-10-29 RX ADMIN — Medication 650 MILLIGRAM(S): at 09:56

## 2024-10-29 RX ADMIN — SUMATRIPTAN SUCCINATE 50 MILLIGRAM(S): 6 INJECTION, SOLUTION SUBCUTANEOUS at 02:10

## 2024-10-29 RX ADMIN — SUMATRIPTAN SUCCINATE 50 MILLIGRAM(S): 6 INJECTION, SOLUTION SUBCUTANEOUS at 04:14

## 2024-10-29 RX ADMIN — Medication 81 MILLIGRAM(S): at 11:44

## 2024-10-29 NOTE — PHYSICAL THERAPY INITIAL EVALUATION ADULT - PERTINENT HX OF CURRENT PROBLEM, REHAB EVAL
Order placed for CT scan of the face to better identify cheek mass  
Patient is a 54F, with PMHx of Migraines and Glaucoma, who comes in with 3 days of blurry vision. When she covers one eye at a time, the vision is clear, but when she tries to see with both, vision is blurry. Also, when she turns her head to the left, her left eye gets more blurry. She has 2 month history of migraine, and the blurry vision does not come with the migraine episodes. She says Sumatriptan helps with her migraine pain, but the effect is temporary. She does have glaucoma and she already had her vision issue evaluted at ophthalmologist. Patient denies headache, fever, chills, nausea, vomit, chest pain, shortness of breath, cough, lightheadedness, abdominal pain, diarrhea, constipation, dark/bloody stool, dysuria, hematuria, loss of strength, or loss of sensation.

## 2024-10-29 NOTE — OCCUPATIONAL THERAPY INITIAL EVALUATION ADULT - PERTINENT HX OF CURRENT PROBLEM, REHAB EVAL
Neurology consult appreciated:    HPI:  Patient is a 54F, with PMHx of Migraines and Glaucoma, who comes in with 3 days of blurry vision. When she covers one eye at a time, the vision is clear, but when she tries to see with both, vision is blurry. Also, when she turns her head to the left, her left eye gets more blurry. She has 2 month history of migraine, and the blurry vision does not come with the migraine episodes. She says Sumatriptan helps with her migraine pain, but the effect is temporary. She does have glaucoma and she already had her vision issue evaluated at ophthalmologist.

## 2024-10-29 NOTE — OCCUPATIONAL THERAPY INITIAL EVALUATION ADULT - ADDITIONAL COMMENTS
Pt reports she lives with significant other in apartment with 0 steps to enter & elevator to living area. Pt was independent with ADLs and mobility prior to admission.

## 2024-10-29 NOTE — DISCHARGE NOTE PROVIDER - PROVIDER TOKENS
FREE:[LAST:[pcp],PHONE:[(   )    -],FAX:[(   )    -]],PROVIDER:[TOKEN:[[id number]]],PROVIDER:[TOKEN:[[id number]]]

## 2024-10-29 NOTE — DISCHARGE NOTE PROVIDER - HOSPITAL COURSE
Patient is a 54F, with PMHx of Migraines and Glaucoma, who comes in with 3 days of blurry vision. Admitted to r/o CVA.      Blurred vision.   - P/w blurred vision for 3 days.   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 Questionable narrowing proximal left posterior cerebral artery. 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 Anterior circulation is widely patent and unremarkable.  - CTA = The neck vasculature is widely patent. No significant stenosis, dissection, or occlusion.  - MR brain MR venogram: negative for any acute finding   - Discussed with Neurologist Dr. Owens.   - PT will need outpt follow up with neurology, she requested someone in this area      Problem/Plan - 2:  ·  Problem: Migraine.   ·  Plan: - Sumatriptan PRN.     Problem/Plan - 3:  ·  Problem: Glaucoma.   ·  Plan: - C/w home timolol, latanoprost  - Follows ophthalmologist.

## 2024-10-29 NOTE — OCCUPATIONAL THERAPY INITIAL EVALUATION ADULT - COGNITIVE, VISUAL PERCEPTUAL, OT EVAL
Pt will identify far sighted objects in community setting with 100% accuracy to increase safety and performance with ADLs in 2 weeks

## 2024-10-29 NOTE — DISCHARGE NOTE PROVIDER - NSDCMRMEDTOKEN_GEN_ALL_CORE_FT
LATANOPROST 0.005% EYE DROPS:   SUMATRIPTAN SUCC 50 MG TABLET:   TIMOLOL MALEATE 0.5% EYE DROPS: instill 1 drop into both eyes twice a day

## 2024-10-29 NOTE — PHYSICAL THERAPY INITIAL EVALUATION ADULT - LIGHT TOUCH SENSATION, RLE, REHAB EVAL
Anesthesia Pre Eval Note    Anesthesia ROS/Med Hx        Anesthetic Complication History:  Patient does not have a history of anesthetic complications      Pulmonary Review:  Patient does not have a pulmonary history      Neuro/Psych Review:  Patient does not have a neuro/psych history       Cardiovascular Review:    Positive for pacemaker - Dependent  Positive for CAD  Positive for dysrhythmias - Paroxysmal A-fib  Positive for hypertension    GI/HEPATIC/RENAL Review:  Patient does not have a GI/hepatic/renalhistory       End/Other Review:    Positive for anemia  Additional Results:       Lab Results       Component                Value               Date                       WBC                      9.6                 03/12/2022                 WBC                      6.7                 11/01/2018                 RBC                      2.39 (L)            03/12/2022                 RBC                      4.52                11/01/2018                 HGB                      7.9 (L)             03/14/2022                 HGB                      13.8                11/01/2018                 HCT                      25.7 (L)            03/14/2022                 MCHC                     32.3                03/12/2022                 MCHC                     33.2                11/01/2018                 SODIUM                   146 (H)             03/12/2022                 SODIUM                   142                 11/01/2018                 POTASSIUM                3.9                 03/12/2022                 POTASSIUM                4.1                 11/01/2018                 CHLORIDE                 112 (H)             03/12/2022                 CO2                      23                  03/12/2022                 CO2                      29                  11/01/2018                 GLUCOSE                  86                  03/12/2022                 GLUCOSE                  116 (H)              11/01/2018                 BUN                      16                  03/12/2022                 BUN                      18                  11/01/2018                 CREATININE               0.98                03/12/2022                 CREATININE               1.10                11/01/2018                 GFRESTIMATE              64                  03/12/2022                 GFRA                     66                  11/01/2018                 GFRNA                    57                  11/01/2018                 CALCIUM                  7.4 (L)             03/12/2022                 PLT                      364                 03/12/2022                 PLT                      191                 11/01/2018                 PTT                      34 (H)              02/22/2022                 PTT                      37 (H)              06/06/2017                 PTT                      NOT APPLICABLE      06/06/2017                 INR                      1.1                 03/11/2022                 INR                      2.2                 06/06/2017             Past Medical History:  No date: Acoustic neuroma (CMS/HCC)      Comment:  left ear, 20 + years ago  No date: Bell's palsy      Comment:  left sided facial paralysis   No date: Coronary artery disease  No date: 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  No date: Essential (primary) hypertension  No date: Hernia of abdominal wall      Comment:  umbilical and hiatal hernia   No date: Shingles      Comment:  2 weeks ago   No date: Thyroid condition    Past Surgical History:  No date: Atrial cardiac pacemaker insertion  No date: Eye surgery          Relevant Problems   No relevant active problems       Physical Exam     Airway   Mallampati: III  TM Distance: >3 FB  Neck ROM: Full  Neck: Able to place in sniff position  TMJ Mobility: Good    Cardiovascular    Cardio Rate: Normal    Head Assessment  Head assessment: Normocephalic and Atraumatic    General  Assessment  General Assessment: Alert and oriented and No acute distress    Dental Exam    Patient has:  Denied broken/chipped/loose teeth    Pulmonary Exam    Patient Demonstrates:  Non-labored Breathing    Abdominal Exam  Abdominal exam normal      Anesthesia Plan:    ASA Status: 3  Anesthesia Type: General    Induction: Intravenous  Preferred Airway Type: Nasal Cannula  Maintenance: TIVA    Post-op Pain Management: Per Surgeon  Postoperative analgesia plan does NOT include opiods    Checklist  Reviewed: NPO Status, Allergies, Medications, Problem list, Past Med History and Patient Summary  Consent/Risks Discussed Statement:  The proposed anesthetic plan, including its risks and benefits, have been discussed with the Patient along with the risks and benefits of alternatives. Questions were encouraged and answered and the patient and/or representative understands and agrees to proceed.        I discussed with the patient (and/or patient's legal representative) the risks and benefits of the proposed anesthesia plan, General, which may include services performed by other anesthesia providers.    Alternative anesthesia plans, if available, were reviewed with the patient (and/or patient's legal representative). Discussion has been held with the patient (and/or patient's legal representative) regarding risks of anesthesia, which include Intra-operative Awareness and emergent situations that may require change in anesthesia plan.    The patient (and/or patient's legal representative) has indicated understanding, his/her questions have been answered, and he/she wishes to proceed with the planned anesthetic.    Blood Products: Not Anticipated     within normal limits

## 2024-10-29 NOTE — DISCHARGE NOTE PROVIDER - CARE PROVIDER_API CALL
pcp,   Phone: (   )    -  Fax: (   )    -  Follow Up Time:     Velma Owens  Neurology  [address], [address]  [place] [address]  Phone: [phone]  Fax: [phone]  Follow Up Time:     Jessi Godfrey  Neurology  [address] [place] [address]  Phone: [phone]  Fax: [phone]  Follow Up Time:

## 2024-10-29 NOTE — DISCHARGE NOTE PROVIDER - CARE PROVIDERS DIRECT ADDRESSES
,DirectAddress_Unknown,DirectAddress_Unknown,[email or web address]

## 2024-10-29 NOTE — DISCHARGE NOTE NURSING/CASE MANAGEMENT/SOCIAL WORK - NSDCPEFALRISK_GEN_ALL_CORE
For information on Fall & Injury Prevention, visit: [email or web address] OR  [email or web address] OR  https://www.cdc.gov/steadi/patient.html

## 2024-10-29 NOTE — OCCUPATIONAL THERAPY INITIAL EVALUATION ADULT - GENERAL OBSERVATIONS, REHAB EVAL
Pt was encountered supine in bed in EDHO; NAD, cardiac monitor in place, AXOX4, pt c/o double vision & is rule out CVA. Pt strength, balance & coordination are WFL. Pt noted with double vision for objects/reading from distance, pt noted with single vision but mild blurriness for near sighted objects/reading.

## 2024-10-29 NOTE — DISCHARGE NOTE NURSING/CASE MANAGEMENT/SOCIAL WORK - FINANCIAL ASSISTANCE
[facility] provides services at a reduced cost to those who are determined to be eligible through [facility]’s financial assistance program. Information regarding [facility]’s financial assistance program can be found by going to [email or web address] or by calling [phone].

## 2024-10-29 NOTE — CONSULT NOTE ADULT - SUBJECTIVE AND OBJECTIVE BOX
Neurology Consult    Reason for Consult: Patient is a 54y old  Female who presents with a chief complaint of Blurry vision (28 Oct 2024 23:56)      HPI:  Patient is a 54F, with PMHx of Migraines and Glaucoma, who comes in with 3 days of blurry vision. When she covers one eye at a time, the vision is clear, but when she tries to see with both, vision is blurry. Also, when she turns her head to the left, her left eye gets more blurry. She has 2 month history of migraine, and the blurry vision does not come with the migraine episodes. She says Sumatriptan helps with her migraine pain, but the effect is temporary. She does have glaucoma and she already had her vision issue evaluted at ophthalmologist. Patient denies headache, fever, chills, nausea, vomit, chest pain, shortness of breath, cough, lightheadedness, abdominal pain, diarrhea, constipation, dark/bloody stool, dysuria, hematuria, loss of strength, or loss of sensation.       PAST MEDICAL & SURGICAL HISTORY:  No pertinent past medical history      Migraines      No significant past surgical history          Allergies: Allergies    No Known Allergies    Intolerances        Social History: Denies toxic habits including tobacco, ETOH or illicit drugs.    Family History: FAMILY HISTORY:  . No family history of strokes    Medications: MEDICATIONS  (STANDING):  aspirin enteric coated 81 milliGRAM(s) Oral daily  atorvastatin 40 milliGRAM(s) Oral at bedtime  influenza   Vaccine 0.5 milliLiter(s) IntraMuscular once  latanoprost 0.005% Ophthalmic Solution 1 Drop(s) Both EYES at bedtime  timolol 0.5% Solution 1 Drop(s) Both EYES daily    MEDICATIONS  (PRN):  acetaminophen     Tablet .. 650 milliGRAM(s) Oral every 6 hours PRN Temp greater or equal to 38C (100.4F), Mild Pain (1 - 3)  aluminum hydroxide/magnesium hydroxide/simethicone Suspension 30 milliLiter(s) Oral every 4 hours PRN Dyspepsia  melatonin 3 milliGRAM(s) Oral at bedtime PRN Insomnia  ondansetron Injectable 4 milliGRAM(s) IV Push every 8 hours PRN Nausea and/or Vomiting  SUMAtriptan 50 milliGRAM(s) Oral every 12 hours PRN Migraine      Review of Systems:  CONSTITUTIONAL:  No weight loss, fever, chills, weakness or fatigue.  HEENT:  Eyes:  No visual loss, blurred vision, double vision or yellow sclera. Ears, Nose, Throat:  No hearing loss, sneezing, congestion, runny nose or sore throat.  SKIN:  No rash or itching.  CARDIOVASCULAR:  No chest pain, chest pressure or chest discomfort. No palpitations or edema.  RESPIRATORY:  No shortness of breath, cough or sputum.  GASTROINTESTINAL:  No anorexia, nausea, vomiting or diarrhea. No abdominal pain or blood.  GENITOURINARY:  No burning on urination or incontinence   NEUROLOGICAL:  No headache, dizziness, syncope, paralysis, ataxia, numbness or tingling in the extremities. No change in bowel or bladder control. no limb weakness. no vision changes.   MUSCULOSKELETAL:  No muscle, back pain, joint pain or stiffness.  HEMATOLOGIC:  No anemia, bleeding or bruising.  LYMPHATICS:  No enlarged nodes. No history of splenectomy.  PSYCHIATRIC:  No history of depression or anxiety.  ENDOCRINOLOGIC:  No reports of sweating, cold or heat intolerance. No polyuria or polydipsia.      Vitals:  Vital Signs Last 24 Hrs  T(C): 36.5 (29 Oct 2024 04:54), Max: 36.9 (28 Oct 2024 16:55)  T(F): 97.7 (29 Oct 2024 04:54), Max: 98.5 (28 Oct 2024 16:55)  HR: 58 (29 Oct 2024 08:56) (57 - 76)  BP: 115/77 (29 Oct 2024 08:56) (108/76 - 128/91)  BP(mean): --  RR: 18 (29 Oct 2024 08:56) (18 - 20)  SpO2: 99% (29 Oct 2024 08:56) (96% - 100%)    Parameters below as of 29 Oct 2024 08:56  Patient On (Oxygen Delivery Method): room air        General Exam:   General Appearance: Appropriately dressed and in no acute distress       Head: Normocephalic, atraumatic and no dysmorphic features  Ear, Nose, and Throat: Moist mucous membranes  CVS: S1S2+  Resp: No SOB, no wheeze or rhonchi  GI: soft NT/ND  Extremities: No edema or cyanosis  Skin: No bruises or rashes     Neurological Exam:  Mental Status: Awake, alert and oriented x 3.  Able to follow simple and complex verbal commands. Able to name and repeat. fluent speech. No obvious aphasia or dysarthria noted.   Cranial Nerves: PERRL, EOMI, VFFC, sensation V1-V3 intact,  no obvious facial asymmetry, equal elevation of palate, scm/trap 5/5, tongue is midline on protrusion.  hearing is grossly intact.   Motor: Normal bulk, tone and strength throughout. Fine finger movements were intact and symmetric. no tremors or drift noted.    Sensation: Intact to light touch and pinprick throughout. no right/left confusion. no extinction to tactile on DSS.   Reflexes: 1+ throughout at biceps, brachioradialis, triceps, patellars and ankles bilaterally and equal. No clonus. R toe and L toe were both downgoing.  Coordination: No dysmetria on FNF   Gait: deferred    Data/Labs/Imaging which I personally reviewed.     Labs:     CBC Full  -  ( 29 Oct 2024 07:00 )  WBC Count : 3.99 K/uL  RBC Count : 3.69 M/uL  Hemoglobin : 11.8 g/dL  Hematocrit : 36.5 %  Platelet Count - Automated : 297 K/uL  Mean Cell Volume : 98.9 fl  Mean Cell Hemoglobin : 32.0 pg  Mean Cell Hemoglobin Concentration : 32.3 g/dL  Auto Neutrophil # : x  Auto Lymphocyte # : x  Auto Monocyte # : x  Auto Eosinophil # : x  Auto Basophil # : x  Auto Neutrophil % : x  Auto Lymphocyte % : x  Auto Monocyte % : x  Auto Eosinophil % : x  Auto Basophil % : x    10-29    137  |  110[H]  |  11  ----------------------------<  89  4.1   |  24  |  0.92    Ca    9.4      29 Oct 2024 07:00    TPro  7.7  /  Alb  3.2[L]  /  TBili  0.3  /  DBili  x   /  AST  16  /  ALT  24  /  AlkPhos  54  10-29    LIVER FUNCTIONS - ( 29 Oct 2024 07:00 )  Alb: 3.2 g/dL / Pro: 7.7 gm/dL / ALK PHOS: 54 U/L / ALT: 24 U/L / AST: 16 U/L / GGT: x           PT/INR - ( 28 Oct 2024 20:44 )   PT: 14.8 sec;   INR: 1.31 ratio         PTT - ( 28 Oct 2024 20:44 )  PTT:27.3 sec  Urinalysis Basic - ( 29 Oct 2024 07:00 )    Color: x / Appearance: x / SG: x / pH: x  Gluc: 89 mg/dL / Ketone: x  / Bili: x / Urobili: x   Blood: x / Protein: x / Nitrite: x   Leuk Esterase: x / RBC: x / WBC x   Sq Epi: x / Non Sq Epi: x / Bacteria: x          c< from: CT Angio Neck w/ IV Cont (10.28.24 @ 20:24) >    ACC: [id number] EXAM:  CT ANGIO NECK (W)AW IC   ORDERED BY: MOOSE NOWAK     ACC: [id number] EXAM:  CT ANGIO BRAIN (W)AW IC   ORDERED BY: MOOSE NOWAK     PROCEDURE DATE:  10/28/2024          INTERPRETATION:  INDICATION: Headache with double vision.    TECHNIQUE:  A thin section CT study of the head and  neck was conducted   during rapid infusion of intravenous contrast (power injected).  In   addition to the axial data, reformatted images were generated using a 3D   workstation. Rapid AI was used to screen for hemorrhage.  I  V Contrast: Omnipaque 350 (accession [id number]), IV contrast documented in   unlinked concurrent exam (accession [id number])  90 cc administered  10 cc   discarded  Complications: None reported at time of study completion    FINDINGS:    AORTIC ARCH no dissection or aneurysmal dilatation is noted. Major vessel   origins are patent. The subclavian arteries are well visualized and   appear to be patent bilaterally.  COMMON CAROTID ARTERIES: Bilaterally patent with tortuosity  RIGHT BIFURCATION: Widely patent  LEFT BIFURCATION: Widely patent  INTERNAL CAROTID ARTERIES: Bilaterally widely patent with mild tortuosity.  VERTEBRALS bilaterally developmentally small but patent.  MISCELLANEOUS:  Degenerative changes noted in the lower cervical spine. A   chronic compression deformity is noted of C5-C6 and C7.      The petrous, cavernous, and supraclinoid carotid arteries are bilaterally   patent      Both anterior cerebral arteries are patent. Both A1 segments are well   formed.    Both middle cerebral arteries are patent. No M1 lesion or distal branch   occlusion suggested.  Posterior cerebral arteries are bilaterally patent with fetal-type supply   on the right.  . There is suspicion of a small focal area of narrowing of the proximal   left posterior cerebral artery at the P1/P2 junction.    The vertebrobasilar system is developmentally small and there is   suggestion of duplication of the distal basilar artery.    The venous sinuses are patent.      IMPRESSION:      1. The neck vasculature is widely patent. No significant stenosis,   dissection, or occlusion.  2. Fetal-type supply of the right posterior cerebral artery with possible   duplication of the distal basilar artery. Questionable narrowing proximal   left posterior cerebral artery. 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 Anterior circulation is widely patent and   unremarkable.  3. Venous sinuses appear to be patent.    Follow-up MRI imaging of the brain recommended along with MRA imaging of   the brain.    --- End of Report ---            VICK MENSAH MD; Attending Radiologist  This document has been electronically signed. Oct 28 2024  8:40PM    < end of copied text >  < from: CT Head No Cont (09.04.24 @ 22:12) >  IMPRESSION:  Negative CT head without contrast. No etiology for headache identified.    < end of copied text >   Neurology Consult    Reason for Consult: Patient is a 54y old  Female who presents with a chief complaint of Blurry vision (28 Oct 2024 23:56)      HPI:  Patient is a 54F, with PMHx of Migraines and Glaucoma, who comes in with 3 days of blurry vision. When she covers one eye at a time, the vision is clear, but when she tries to see with both, vision is blurry. Also, when she turns her head to the left, her left eye gets more blurry. She has 2 month history of migraine, and the blurry vision does not come with the migraine episodes. She says Sumatriptan helps with her migraine pain, but the effect is temporary. She does have glaucoma and she already had her vision issue evaluted at ophthalmologist. Patient denies headache, fever, chills, nausea, vomit, chest pain, shortness of breath, cough, lightheadedness, abdominal pain, diarrhea, constipation, dark/bloody stool, dysuria, hematuria, loss of strength, or loss of sensation.       PAST MEDICAL & SURGICAL HISTORY:  No pertinent past medical history      Migraines      No significant past surgical history          Allergies: Allergies    No Known Allergies    Intolerances        Social History: Denies toxic habits including tobacco, ETOH or illicit drugs.    Family History: FAMILY HISTORY:  . No family history of strokes    Medications: MEDICATIONS  (STANDING):  aspirin enteric coated 81 milliGRAM(s) Oral daily  atorvastatin 40 milliGRAM(s) Oral at bedtime  influenza   Vaccine 0.5 milliLiter(s) IntraMuscular once  latanoprost 0.005% Ophthalmic Solution 1 Drop(s) Both EYES at bedtime  timolol 0.5% Solution 1 Drop(s) Both EYES daily    MEDICATIONS  (PRN):  acetaminophen     Tablet .. 650 milliGRAM(s) Oral every 6 hours PRN Temp greater or equal to 38C (100.4F), Mild Pain (1 - 3)  aluminum hydroxide/magnesium hydroxide/simethicone Suspension 30 milliLiter(s) Oral every 4 hours PRN Dyspepsia  melatonin 3 milliGRAM(s) Oral at bedtime PRN Insomnia  ondansetron Injectable 4 milliGRAM(s) IV Push every 8 hours PRN Nausea and/or Vomiting  SUMAtriptan 50 milliGRAM(s) Oral every 12 hours PRN Migraine      Review of Systems:  CONSTITUTIONAL:  No weight loss, fever, chills, weakness or fatigue.  HEENT:  Eyes:  No visual loss, blurred vision, double vision or yellow sclera. Ears, Nose, Throat:  No hearing loss, sneezing, congestion, runny nose or sore throat.  SKIN:  No rash or itching.  CARDIOVASCULAR:  No chest pain, chest pressure or chest discomfort. No palpitations or edema.  RESPIRATORY:  No shortness of breath, cough or sputum.  GASTROINTESTINAL:  No anorexia, nausea, vomiting or diarrhea. No abdominal pain or blood.  GENITOURINARY:  No burning on urination or incontinence   NEUROLOGICAL:  No headache, dizziness, syncope, paralysis, ataxia, numbness or tingling in the extremities. No change in bowel or bladder control. no limb weakness. no vision changes.   MUSCULOSKELETAL:  No muscle, back pain, joint pain or stiffness.  HEMATOLOGIC:  No anemia, bleeding or bruising.  LYMPHATICS:  No enlarged nodes. No history of splenectomy.  PSYCHIATRIC:  No history of depression or anxiety.  ENDOCRINOLOGIC:  No reports of sweating, cold or heat intolerance. No polyuria or polydipsia.      Vitals:  Vital Signs Last 24 Hrs  T(C): 36.5 (29 Oct 2024 04:54), Max: 36.9 (28 Oct 2024 16:55)  T(F): 97.7 (29 Oct 2024 04:54), Max: 98.5 (28 Oct 2024 16:55)  HR: 58 (29 Oct 2024 08:56) (57 - 76)  BP: 115/77 (29 Oct 2024 08:56) (108/76 - 128/91)  BP(mean): --  RR: 18 (29 Oct 2024 08:56) (18 - 20)  SpO2: 99% (29 Oct 2024 08:56) (96% - 100%)    Parameters below as of 29 Oct 2024 08:56  Patient On (Oxygen Delivery Method): room air        General Exam:   General Appearance: Appropriately dressed and in no acute distress       Head: Normocephalic, atraumatic and no dysmorphic features  Ear, Nose, and Throat: Moist mucous membranes  CVS: S1S2+  Resp: No SOB, no wheeze or rhonchi  GI: soft NT/ND  Extremities: No edema or cyanosis  Skin: No bruises or rashes     Neurological Exam:  Mental Status: Awake, alert and oriented x 3.  Able to follow simple and complex verbal commands. Able to name and repeat. fluent speech. No obvious aphasia or dysarthria noted.   Cranial Nerves: PERRL, EOMI, VFFC, no diplopia with glasses, no ptosis, sensation V1-V3 intact,  no obvious facial asymmetry, equal elevation of palate, scm/trap 5/5, tongue is midline on protrusion.  hearing is grossly intact.   Motor: Normal bulk, tone and strength throughout. Fine finger movements were intact and symmetric. no tremors or drift noted.    Sensation: Intact to light touch and pinprick throughout. no right/left confusion. no extinction to tactile on DSS.   Reflexes: 1+ throughout at biceps, brachioradialis, triceps, patellars and ankles bilaterally and equal. No clonus. R toe and L toe were both downgoing.  Coordination: No dysmetria on FNF   Gait: deferred    Data/Labs/Imaging which I personally reviewed.     Labs:     CBC Full  -  ( 29 Oct 2024 07:00 )  WBC Count : 3.99 K/uL  RBC Count : 3.69 M/uL  Hemoglobin : 11.8 g/dL  Hematocrit : 36.5 %  Platelet Count - Automated : 297 K/uL  Mean Cell Volume : 98.9 fl  Mean Cell Hemoglobin : 32.0 pg  Mean Cell Hemoglobin Concentration : 32.3 g/dL  Auto Neutrophil # : x  Auto Lymphocyte # : x  Auto Monocyte # : x  Auto Eosinophil # : x  Auto Basophil # : x  Auto Neutrophil % : x  Auto Lymphocyte % : x  Auto Monocyte % : x  Auto Eosinophil % : x  Auto Basophil % : x    10-29    137  |  110[H]  |  11  ----------------------------<  89  4.1   |  24  |  0.92    Ca    9.4      29 Oct 2024 07:00    TPro  7.7  /  Alb  3.2[L]  /  TBili  0.3  /  DBili  x   /  AST  16  /  ALT  24  /  AlkPhos  54  10-29    LIVER FUNCTIONS - ( 29 Oct 2024 07:00 )  Alb: 3.2 g/dL / Pro: 7.7 gm/dL / ALK PHOS: 54 U/L / ALT: 24 U/L / AST: 16 U/L / GGT: x           PT/INR - ( 28 Oct 2024 20:44 )   PT: 14.8 sec;   INR: 1.31 ratio         PTT - ( 28 Oct 2024 20:44 )  PTT:27.3 sec  Urinalysis Basic - ( 29 Oct 2024 07:00 )    Color: x / Appearance: x / SG: x / pH: x  Gluc: 89 mg/dL / Ketone: x  / Bili: x / Urobili: x   Blood: x / Protein: x / Nitrite: x   Leuk Esterase: x / RBC: x / WBC x   Sq Epi: x / Non Sq Epi: x / Bacteria: x          c< from: CT Angio Neck w/ IV Cont (10.28.24 @ 20:24) >    ACC: [id number] EXAM:  CT ANGIO NECK (W)AW IC   ORDERED BY: MOOSE NOWAK     ACC: [id number] EXAM:  CT ANGIO BRAIN (W)AW IC   ORDERED BY: MOOSE NOWAK     PROCEDURE DATE:  10/28/2024          INTERPRETATION:  INDICATION: Headache with double vision.    TECHNIQUE:  A thin section CT study of the head and  neck was conducted   during rapid infusion of intravenous contrast (power injected).  In   addition to the axial data, reformatted images were generated using a 3D   workstation. Rapid AI was used to screen for hemorrhage.  I  V Contrast: Omnipaque 350 (accession [id number]), IV contrast documented in   unlinked concurrent exam (accession [id number])  90 cc administered  10 cc   discarded  Complications: None reported at time of study completion    FINDINGS:    AORTIC ARCH no dissection or aneurysmal dilatation is noted. Major vessel   origins are patent. The subclavian arteries are well visualized and   appear to be patent bilaterally.  COMMON CAROTID ARTERIES: Bilaterally patent with tortuosity  RIGHT BIFURCATION: Widely patent  LEFT BIFURCATION: Widely patent  INTERNAL CAROTID ARTERIES: Bilaterally widely patent with mild tortuosity.  VERTEBRALS bilaterally developmentally small but patent.  MISCELLANEOUS:  Degenerative changes noted in the lower cervical spine. A   chronic compression deformity is noted of C5-C6 and C7.      The petrous, cavernous, and supraclinoid carotid arteries are bilaterally   patent      Both anterior cerebral arteries are patent. Both A1 segments are well   formed.    Both middle cerebral arteries are patent. No M1 lesion or distal branch   occlusion suggested.  Posterior cerebral arteries are bilaterally patent with fetal-type supply   on the right.  . There is suspicion of a small focal area of narrowing of the proximal   left posterior cerebral artery at the P1/P2 junction.    The vertebrobasilar system is developmentally small and there is   suggestion of duplication of the distal basilar artery.    The venous sinuses are patent.      IMPRESSION:      1. The neck vasculature is widely patent. No significant stenosis,   dissection, or occlusion.  2. Fetal-type supply of the right posterior cerebral artery with possible   duplication of the distal basilar artery. Questionable narrowing proximal   left posterior cerebral artery. 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 Anterior circulation is widely patent and   unremarkable.  3. Venous sinuses appear to be patent.    Follow-up MRI imaging of the brain recommended along with MRA imaging of   the brain.    --- End of Report ---            VICK MENSAH MD; Attending Radiologist  This document has been electronically signed. Oct 28 2024  8:40PM    < end of copied text >  < from: CT Head No Cont (09.04.24 @ 22:12) >  IMPRESSION:  Negative CT head without contrast. No etiology for headache identified.    < end of copied text >      mm< from: MR Head No Cont (10.29.24 @ 15:20) >    ACC: [id number] EXAM:  MR VENOGRAM BRAIN   ORDERED BY: RIKA CASILLAS     ACC: [id number] EXAM:  MR BRAIN   ORDERED BY: RIKA CASILLAS     PROCEDURE DATE:  10/29/2024          INTERPRETATION:  Two examinations were performed on this patient:  1.  MR of the brain without without gadolinium contrast  2.  MR venogram of the brain without gadolinium contrast    CLINICAL INFORMATION:   Headache and double vision stroke  FMR    TECHNIQUE:     MR brain:   Sagittal and axial T1-weighted images, axial FLAIR images,   axial susceptibility-weighted/gradient echo images, axial T2-weighted   images and axial diffusion weighted images of the brain were obtained.    MRV brain:   Three-dimensional time of flight MR venogram was performed   in the coronal plane. Thiswas repeated in the axial plane.  Post   processing angiographic reconstruction of images was performed.   Each   data set was reconstructed as maximum intensity pixel images and   displayed in multiple rotations.  CONTRAST:    None    COMPARISON:   CT head 9/4/2024 and CT angiography  10/28/2024    FINDINGS:    MR BRAIN:    BRAIN:   The brain demonstrates no abnormal signal intensity.   No   diffusion restriction is found in the brain.  No acute cerebral cortical   infarct is found.   No intracranial hemorrhage is recognized.  No mass   effect is found in the brain.    CSF SPACES:   The ventricles, sulci and basal cisterns appear   unremarkable.    VESSELS: Each internal carotid artery and the right vertebral artery   demonstrate expected flow voids indicating patency. The left vertebral   artery is not well-seen likely secondary to its small caliber.    HEAD AND NECK STRUCTURES:   The orbits are unremarkable.  Paranasal   sinuses are clear.  The nasal cavity appears intact.  The centralskull   base appears intact.  The nasopharynx is symmetric.  The temporal bones   appear clear of disease.  The calvarium appears unremarkable.    MRV BRAIN:    The principal dural venous sinuses are patent.  This includes the   superior sagittal sinus anterior and posterior limbs.  The dominant   caliber right transverse is patent to the right sigmoid sinus and right   internal jugular vein.  The smaller caliber left transverse is   hypoplastic between the torcula in the interval of its vein of Cesia. Its   peripheral aspect is patent to the left sigmoid sinus and left internal   jugular vein. Asymmetry of the transverse sinus caliber is within the   limits of developmental variation.    Cerebral cortical veins have physiologic appearance and approximate   symmetry.    The internal cerebral veins, basal veins of Guilherme, vein of Vladislav and   straight sinus appear patent.    The cavernous sinuses appear symmetric and demonstrate no anomalous flow.     The superior ophthalmic veins are not dilated.    No anomalous vascularity is identified.      IMPRESSION:    1.  MR BRAIN:   Unremarkable MR of the brain.  No evidence of infarction.    2.  MRV brain:   No evidence of dural sinus thrombosis.    --- End of Report ---            NICCI ORLANDO MD  This document has been electronically signed. Oct 29 2024  3:29PM    < end of copied text >

## 2024-10-29 NOTE — CONSULT NOTE ADULT - ASSESSMENT
54F, with PMHx of Migraines and Glaucoma here with 3 days of binocular diplopia associated with 3 months of mirgaine. Temporary relief with sumatriptan. Seen outpatient by Optho friday - was told no acute findings on exam  CTH from 9/4 neg  CTA 10/28 with fetal R PCA, possible distal basilar duplications and proximal L PCA narrowing. No repeat CTH performed 54F, with PMHx of Migraines and Glaucoma here with 3 days of binocular diplopia associated with 3 months of mirgaine. Temporary relief with sumatriptan. Seen outpatient by Optho friday - was told no acute findings on exam  CTH from 9/4 neg  CTA 10/28 with fetal R PCA, possible distal basilar duplications and proximal L PCA narrowing. No repeat CTH performed  MRI brain and MRV negative  No gross neurologic findings on exam, wears glasses for reading    Suspect worsening of chronic migraines, a/w possible contribution of underlying glaucoma, low suspicion for underlying inflammatory process in setting of normal imaging. Rec MRI be performed with contrast but doubt MS or other underlying disorder for which would require repeat imaging at this time  Also low clinical suspicion for IIH  Suggest sumatriptan 50mg PRN daily  Will provide info for outpatient f/u as pt requesting new neurologist    d/w pt    Velma Owens DO  Vascular Neurology  Office [phone]

## 2024-10-29 NOTE — DISCHARGE NOTE PROVIDER - NSDCCPCAREPLAN_GEN_ALL_CORE_FT
PRINCIPAL DISCHARGE DIAGNOSIS  Diagnosis: Headache  Assessment and Plan of Treatment: Patient is a 54F, with PMHx of Migraines and Glaucoma, who comes in with 3 days of blurry vision. Admitted to r/o CVA.  Blurred vision.   - P/w blurred vision for 3 days.   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 Questionable narrowing proximal left posterior cerebral artery. 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 Anterior circulation is widely patent and unremarkable.  - CTA = The neck vasculature is widely patent. No significant stenosis, dissection, or occlusion.  - MR brain MR venogram: negative for any acute finding   - Discussed with Neurologist Dr. Owens.   - PT will need outpt follow up with neurology, she requested someone in this area    Problem/Plan - 2:  ·  Problem: Migraine.   ·  Plan: - Sumatriptan PRN.   Problem/Plan - 3:  ·  Problem: Glaucoma.   ·  Plan: - C/w home timolol, latanoprost  - Follows ophthalmologist.        SECONDARY DISCHARGE DIAGNOSES  Diagnosis: Double vision  Assessment and Plan of Treatment:

## 2024-10-29 NOTE — PHYSICAL THERAPY INITIAL EVALUATION ADULT - GENERAL OBSERVATIONS, REHAB EVAL
Pt found semi supine in bed in NAD, +hep lock, agreeable to PT Corinne and RN covering RN Radha made aware.

## 2024-10-29 NOTE — DISCHARGE NOTE PROVIDER - ATTENDING DISCHARGE PHYSICAL EXAMINATION:
Vital Signs Last 24 Hrs  T(C): 36.7 (29 Oct 2024 10:06), Max: 36.9 (28 Oct 2024 16:55)  T(F): 98 (29 Oct 2024 10:06), Max: 98.5 (28 Oct 2024 16:55)  HR: 60 (29 Oct 2024 16:35) (57 - 76)  BP: 106/77 (29 Oct 2024 16:35) (106/77 - 128/91)  BP(mean): --  RR: 18 (29 Oct 2024 16:35) (18 - 20)  SpO2: 95% (29 Oct 2024 16:35) (95% - 100%)    Parameters below as of 29 Oct 2024 16:35  Patient On (Oxygen Delivery Method): room air    GENERAL: NAD, well-groomed, well-developed  HEAD:  Atraumatic, Normocephalic  EYES: EOMI, PERRLA, conjunctiva and sclera clear, mild left ptosis   ENMT: No tonsillar erythema, exudates, or enlargement; Moist mucous membranes, Good dentition, No lesions  NECK: Supple, No JVD, Normal thyroid  NERVOUS SYSTEM:  Alert & Oriented X3, Mild ptosis left eye, otherwise non focal   CHEST/LUNG: Clear to percussion bilaterally; No rales, rhonchi, wheezing, or rubs  HEART: Regular rate and rhythm; No murmurs, rubs, or gallops  ABDOMEN: Soft, Nontender, Nondistended; Bowel sounds present  EXTREMITIES:  2+ Peripheral Pulses, No clubbing, cyanosis, or edema  LYMPH: No lymphadenopathy noted  SKIN: No rashes or lesions

## 2024-10-29 NOTE — OCCUPATIONAL THERAPY INITIAL EVALUATION ADULT - VISUAL ACUITY
Pt reports double vision for far sighted objects, blurry vision for near sighted objects, pt able to read thePlatform signs & name tags with 100% accuracy

## 2024-10-29 NOTE — CONSULT NOTE ADULT - CONSULT REASON
HA with diplopia
Call light is within reach, educate patient/family on its functionality/Environment clear of unused equipment, furniture's in place, clear of hazards/Assess for adequate lighting, leave nightlight on/Patient and family education available to parents and patient

## 2024-10-29 NOTE — DISCHARGE NOTE NURSING/CASE MANAGEMENT/SOCIAL WORK - PATIENT PORTAL LINK FT
You can access the FollowMyHealth Patient Portal offered by [facility] by registering at the following website: [email or web address]. By joining [facility]’s FollowMyHealth portal, you will also be able to view your health information using other applications (apps) compatible with our system.

## 2024-11-06 DIAGNOSIS — H40.9 UNSPECIFIED GLAUCOMA: ICD-10-CM

## 2024-11-06 DIAGNOSIS — G43.709 CHRONIC MIGRAINE WITHOUT AURA, NOT INTRACTABLE, WITHOUT STATUS MIGRAINOSUS: ICD-10-CM

## 2024-11-06 DIAGNOSIS — H53.2 DIPLOPIA: ICD-10-CM

## 2024-12-18 ENCOUNTER — APPOINTMENT (OUTPATIENT)
Dept: PAIN MANAGEMENT | Facility: CLINIC | Age: 54
End: 2024-12-18

## 2024-12-18 VITALS
SYSTOLIC BLOOD PRESSURE: 122 MMHG | BODY MASS INDEX: 37.21 KG/M2 | WEIGHT: 210 LBS | DIASTOLIC BLOOD PRESSURE: 76 MMHG | HEIGHT: 63 IN | HEART RATE: 60 BPM

## 2024-12-18 PROCEDURE — 99205 OFFICE O/P NEW HI 60 MIN: CPT

## 2025-02-05 ENCOUNTER — APPOINTMENT (OUTPATIENT)
Dept: PAIN MANAGEMENT | Facility: CLINIC | Age: 55
End: 2025-02-05

## 2025-02-05 VITALS
WEIGHT: 212 LBS | DIASTOLIC BLOOD PRESSURE: 82 MMHG | HEART RATE: 66 BPM | HEIGHT: 63 IN | BODY MASS INDEX: 37.56 KG/M2 | SYSTOLIC BLOOD PRESSURE: 123 MMHG

## 2025-02-05 PROCEDURE — 99213 OFFICE O/P EST LOW 20 MIN: CPT | Mod: 25

## 2025-02-05 PROCEDURE — 64615 CHEMODENERV MUSC MIGRAINE: CPT

## 2025-03-06 ENCOUNTER — APPOINTMENT (OUTPATIENT)
Dept: PAIN MANAGEMENT | Facility: CLINIC | Age: 55
End: 2025-03-06

## 2025-05-20 ENCOUNTER — APPOINTMENT (OUTPATIENT)
Dept: PAIN MANAGEMENT | Facility: CLINIC | Age: 55
End: 2025-05-20
Payer: COMMERCIAL

## 2025-05-20 ENCOUNTER — NON-APPOINTMENT (OUTPATIENT)
Age: 55
End: 2025-05-20

## 2025-05-20 VITALS
HEIGHT: 63 IN | DIASTOLIC BLOOD PRESSURE: 66 MMHG | BODY MASS INDEX: 36.86 KG/M2 | SYSTOLIC BLOOD PRESSURE: 101 MMHG | WEIGHT: 208 LBS | HEART RATE: 60 BPM

## 2025-05-20 PROCEDURE — 99213 OFFICE O/P EST LOW 20 MIN: CPT | Mod: 25

## 2025-05-20 PROCEDURE — 64615 CHEMODENERV MUSC MIGRAINE: CPT
